# Patient Record
Sex: FEMALE | Race: WHITE | NOT HISPANIC OR LATINO | Employment: FULL TIME | URBAN - METROPOLITAN AREA
[De-identification: names, ages, dates, MRNs, and addresses within clinical notes are randomized per-mention and may not be internally consistent; named-entity substitution may affect disease eponyms.]

---

## 2024-06-14 ENCOUNTER — TELEPHONE (OUTPATIENT)
Dept: OPHTHALMOLOGY | Facility: CLINIC | Age: 39
End: 2024-06-14
Payer: COMMERCIAL

## 2024-06-14 ENCOUNTER — OFFICE VISIT (OUTPATIENT)
Dept: OPHTHALMOLOGY | Facility: CLINIC | Age: 39
End: 2024-06-14
Payer: COMMERCIAL

## 2024-06-14 DIAGNOSIS — H52.13 MYOPIA OF BOTH EYES: ICD-10-CM

## 2024-06-14 DIAGNOSIS — Z01.00 ENCOUNTER FOR EYE EXAM: Primary | ICD-10-CM

## 2024-06-14 PROCEDURE — 92015 DETERMINE REFRACTIVE STATE: CPT | Mod: ,,, | Performed by: OPTOMETRIST

## 2024-06-14 PROCEDURE — 92004 COMPRE OPH EXAM NEW PT 1/>: CPT | Mod: ,,, | Performed by: OPTOMETRIST

## 2024-06-14 PROCEDURE — 92310 CONTACT LENS FITTING OU: CPT | Mod: CSM,S$GLB,, | Performed by: OPTOMETRIST

## 2024-06-14 PROCEDURE — 99999 PR PBB SHADOW E&M-EST. PATIENT-LVL II: CPT | Mod: PBBFAC,,, | Performed by: OPTOMETRIST

## 2024-06-14 NOTE — TELEPHONE ENCOUNTER
----- Message from Lorelei Andrade sent at 6/14/2024  9:29 AM CDT -----  Contact: [phone]  Type:  Same Day Appointment Request    Caller is requesting a same day appointment.  Caller declined first available appointment listed below.    Name of Caller:NAT ADKINS [[id number]]  When is the first available appointment?as soon as possible  Symptoms:COMPLETELY OUT OF CONTACTS / VISION EXAM  Best Call Back Number:[phone]  Additional Information: MRN [id number]

## 2024-06-14 NOTE — PROGRESS NOTES
HPI    Vision changes since last eye exam?: Pt has been experecnig blurriness   with far vision and night time driving due to glares. Pt is wearing CTL   full time and is not currently using any eyedrops.     Any eye pain today: No.    Other ocular symptoms: None noticed by pt.     Interested in contact lens fitting today? Yes. Pt would like to be fitted   for CTL and SVL eyeglasses.      Last edited by María Rich on 6/14/2024 10:34 AM.            Assessment /Plan     For exam results, see Encounter Report.    1. Encounter for eye exam  Normal fundus exam.  Crx given, wear full time.    2. Myopia of both eyes  Eyeglass Final Rx       Eyeglass Final Rx         Sphere Cylinder Axis    Right -5.75 +0.75 088    Left -5.75 +0.25 080      Type: SVL    Expiration Date: 6/14/2025   PD 60                 Contact Lens Final Rx       Final Contact Lens Rx         Brand Base Curve Diameter Sphere    Right Acuvue Oasys 1 Day 8.5 14.3 -5.25    Left Acuvue Oasys 1 Day 8.5 14.3 -5.25      Expiration Date: 6/14/2025    Replacement: Daily    Wearing Schedule: Daily Wear                  Contact lens trials fitted in office today. Contact lens hygiene reviewed. Patient able to insert the lenses themselves with minimal difficulty. Patient ok to finalize Contact lens after 1 week of wear. RTC if still having difficulty with CTL trial after 1 week.      RTC 1 yr for dilated eye exam or sooner if any changes to vision.   Discussed above and answered questions.

## 2024-06-17 ENCOUNTER — TELEPHONE (OUTPATIENT)
Dept: OPHTHALMOLOGY | Facility: CLINIC | Age: 39
End: 2024-06-17
Payer: COMMERCIAL

## 2024-06-17 NOTE — TELEPHONE ENCOUNTER
----- Message from Janey Yeboah sent at 6/17/2024  9:58 AM CDT -----  Regarding: pt  Name of Who is Calling:Pt         What is the request in detail: Requesting new script for contact lenses           Can the clinic reply by [facility]: no        [facility]:Telephone Information:  Mobile          [phone]

## 2024-10-24 ENCOUNTER — PATIENT MESSAGE (OUTPATIENT)
Dept: RESEARCH | Facility: HOSPITAL | Age: 39
End: 2024-10-24
Payer: COMMERCIAL